# Patient Record
Sex: FEMALE | Race: WHITE
[De-identification: names, ages, dates, MRNs, and addresses within clinical notes are randomized per-mention and may not be internally consistent; named-entity substitution may affect disease eponyms.]

---

## 2017-03-01 NOTE — NM
EXAMINATION: Nuclear medicine hepatobiliary study (HIDA) with cholecystokinin (calculation of gallbl
adder ejection fraction for  function).

 

HISTORY: Right upper quadrant pain.

 

PROCEDURE:

Following intravenous administration of  3.5  mCi of technetium 99m Choletec, dynamic images were ob
tained up to one-hour post injection.

 

This is followed by slow intravenous administration of 1.6 mcg of CCK and additional dynamic images 
were obtained. Gallbladder ejection fraction is calculated.

 

FINDINGS:

The initial dynamic images demonstrates clearance of the tracer from the blood pool with the prompt 
tracer uptake by the liver. By 25 minutes tracer activity is noted in the gallbladder. The post CCK 
images demonstrates optimal contraction of the gallbladder with ejection fraction of 49 percent (nor
mal is equal or more than 35%). Tracer activity is noted in the small intestine following CCK admini
stration. 

 

IMPRESSION:

1. Patent cystic and common bile ducts. Normal liver function.

2. Normal gallbladder ejection fraction following CCK administration (  49 %; normal equal or more t
lacy 35%).

## 2019-11-15 ENCOUNTER — HOSPITAL ENCOUNTER (EMERGENCY)
Dept: HOSPITAL 56 - MW.ED | Age: 59
Discharge: HOME | End: 2019-11-15
Payer: COMMERCIAL

## 2019-11-15 DIAGNOSIS — M25.562: Primary | ICD-10-CM

## 2019-11-15 NOTE — EDM.PDOC
ED HPI GENERAL MEDICAL PROBLEM





- General


Chief Complaint: Lower Extremity Injury/Pain


Time Seen by Provider: 11/15/19 20:14


Source of Information: Reports: Patient


History Limitations: Reports: No Limitations





- History of Present Illness


INITIAL COMMENTS - FREE TEXT/NARRATIVE: 


HISTORY AND PHYSICAL:





History of present illness:


Patient is a 59-year-old female who presents to the emergency room with 

complaints of left posterior/medial knee pain. She states she has had some 

discomfort for approximately one week which is progressively gotten worse. She 

describes the pain as a sharp pressure when she fully extends her leg out or 

her leg is locked while ambulating. She denies any previous injury, trauma or 

falls. She does have an appointment next week with Dr. Benedict Nugent, but didn't 

want to wait that long to have it evaluated. Denies any numbness, tingling or 

weakness of the affected extremity.





Patient denies any fever, chills, headache, change in vision, syncope or near 

syncope. Denies any chest pain, back pain, shortness of breath or cough. Denies 

any GI or  symptoms. Patient has been eating and drinking appropriately.





Review of systems: 


As per history of present illness and below otherwise all systems reviewed and 

negative.





Past medical history: 


As per history of present illness and as reviewed below otherwise 

noncontributory.





Surgical history: 


As per history of present illness and as reviewed below otherwise 

noncontributory.





Social history: 


See social history for further information





Family history: 


As per history of present illness and as reviewed below otherwise 

noncontributory.





Physical exam:


General: Well developed and well nourished 59 year old female. Alert and 

orientated. Nontoxic appearing and in no acute distress


HEENT: Atraumatic, normocephalic, pupils equal and reactive bilaterally, 

negative for conjunctival pallor or scleral icterus, mucous membranes moist,

trachea midline. No drooling or trismus noted. No meningeal signs. No hot 

potato voice noted. 


Lungs: Clear to auscultation, breath sounds equal bilaterally, chest nontender.


Heart: S1S2, regular rate and rhythm without overt murmur


Abdomen: Soft, nondistended, nontender. 


Skin: Intact, warm, dry. No lesions or rashes noted.


Extremities: Moves all extremities per self without difficulty or deficits, 

negative drawer test, no knee instability, negative for cords or calf pain, and 

strong pedal/pretibial pulses bilaterally. Neurovascular unremarkable.


Neuro: Awake, alert, oriented. Cranial nerves II through XII unremarkable. 

Cerebellum unremarkable. Motor and sensory unremarkable throughout. Exam 

nonfocal.





Notes:


X-ray shows no acute findings. We discussed the need for follow up with ortho; 

could be a bakers cyst. Supportive care measures were reviewed and discussed. 

Voices understanding and is agreeable to plan of care. Denies any further 

questions or concerns at this time.





Diagnostics:


X-ray





Therapeutics:


Patellar Cut-Out, Crutches





Prescription:


Tramadol (#15)





Impression: 


Left knee pain





Plan:


1. Rest, ice, elevate the affected extremity. Please wear the splint and use 

crutches as directed.


2. Tylenol and/or Ibuprofen as needed for pain management. 


3. Follow up with the Orthopedic provider as we discussed. Return to the ED as 

needed and as discussed.





Definitive disposition and diagnosis as appropriate pending reevaluation and 

review of above.





  ** left knee


Pain Score (Numeric/FACES): 3





- Related Data


 Allergies











Allergy/AdvReac Type Severity Reaction Status Date / Time


 


No Known Allergies Allergy   Verified 11/15/19 20:32











Home Meds: 


 Home Meds





Diclofenac Sodium [Voltaren] 0 mg PO ASDIRECTED PRN 11/15/19 [History]


Fluticasone Propionate [Flonase Allergy Relief] 1 spray IN ASDIRECTED 11/15/19 [

History]


Pantoprazole Sodium [Protonix] 0 mg PO DAILY 11/15/19 [History]


traMADol [Ultram] 50 mg PO Q4H PRN #15 tab 11/15/19 [Rx]











Review of Systems





- Review of Systems


Review Of Systems: Comprehensive ROS is negative, except as noted in HPI.





ED EXAM, GENERAL





- Physical Exam


Exam: See Below (See dictation)





Course





- Vital Signs


Last Recorded V/S: 


 Last Vital Signs











Temp  96 F   11/15/19 20:20


 


Pulse  77   11/15/19 20:20


 


Resp  18   11/15/19 20:20


 


BP  176/89 H  11/15/19 20:20


 


Pulse Ox  95   11/15/19 20:20














- Orders/Labs/Meds


Orders: 


 Active Orders 24 hr











 Category Date Time Status


 


 Knee 3V Lt [CR] Stat Exams  11/15/19 20:28 Taken














Departure





- Departure


Time of Disposition: 20:56


Disposition: Home, Self-Care 01


Clinical Impression: 


Knee pain, left


Qualifiers:


 Chronicity: acute Qualified Code(s): M25.562 - Pain in left knee








- Discharge Information


Instructions:  Knee Pain, Adult, Easy-to-Read


Referrals: 


Benedict Nugent MD [Primary Care Provider] - 


Forms:  ED Department Discharge


Additional Instructions: 


The following information is given to patients seen in the emergency department 

who are being discharged to home. This information is to outline your options 

for follow-up care. We provide all patients seen in our emergency department 

with a follow-up referral.





The need for follow-up, as well as the timing and circumstances, are variable 

depending upon the specifics of your emergency department visit.





If you don't have a primary care physician on staff, we will provide you with a 

referral. We always advise you to contact your personal physician following an 

emergency department visit to inform them of the circumstance of the visit and 

for follow-up with them and/or the need for any referrals to a consulting 

specialist.





The emergency department will also refer you to a specialist when appropriate. 

This referral assures that you have the opportunity for follow-up care with a 

specialist. All of these measure are taken in an effort to provide you with 

optimal care, which includes your follow-up.





Under all circumstances we always encourage you to contact your private 

physician who remains a resource for coordinating your care. When calling for 

follow-up care, please make the office aware that this follow-up is from your 

recent emergency room visit. If for any reason you are refused follow-up, 

please contact the [facility] Emergency 

Department at [phone] and asked to speak to the emergency department 

charge nurse.





[facility]


Primary Care


[address] [place] [address]


Phone: [phone]


Fax: [phone]





[address] [address]


Phone: [phone]


Fax: [phone]





[facility]


Specialty Care - Orthopedic Clinic


[address]


[address] [facility], [address]


[place] [address]


Phone: [phone]





1. Rest, ice, elevate the affected extremity. Please wear the splint and use 

crutches as directed.


2. Tylenol and/or Ibuprofen as needed for pain management. 


3. Follow up with the Orthopedic provider as we discussed. Return to the ED as 

needed and as discussed.





- My Orders


Last 24 Hours: 


My Active Orders





11/15/19 20:28


Knee 3V Lt [CR] Stat 














- Assessment/Plan


Last 24 Hours: 


My Active Orders





11/15/19 20:28


Knee 3V Lt [CR] Stat

## 2019-11-15 NOTE — CR
INDICATION: heard a "pop" today, left knee pain x1 wk - no injury



TECHNIQUE:



Left knee 3 views.



COMPARISON:



None.



FINDINGS:



Bones: Alignment is normal. No fractures or bone lesions.  



Joint spaces: Unremarkable.  



Soft tissues: Unremarkable.  



IMPRESSION:



Unremarkable left knee.



Dictated by: John Shields MD @ 11/15/2019 20:52:04



(Electronically Signed)

## 2021-11-12 NOTE — EDM.PDOC
ED HPI GENERAL MEDICAL PROBLEM





- General


Chief Complaint: Respiratory Problem


Stated Complaint: POS FOR COVID/ DEHYDRATED


Time Seen by Provider: 21 07:17





- History of Present Illness


INITIAL COMMENTS - FREE TEXT/NARRATIVE: 


Otherwise well 61-year-old female presents with generalized weakness and nausea 

in the setting of COVID-19 infection.  Patient developed symptoms around 8 days 

ago and tested positive 3 days later.  Patient reports loss of taste and 

swelling significant postprandial nausea leading to diminished p.o. intake.  

Symptoms are associated with some generalized weakness.  There is a mild cough 

but no severe shortness of breath.  Patient reports the fatigue and generalized 

weakness and the most pronounced symptoms.  No abdominal pain couple episodes of

diarrhea and some vomiting with brushing of her teeth because her mouth is so 

dry but no trouble with persistent nausea.





- Related Data


                                    Allergies











Allergy/AdvReac Type Severity Reaction Status Date / Time


 


No Known Allergies Allergy   Verified 21 07:15











Home Meds: 


                                    Home Meds





Diclofenac Sodium [Voltaren] 0 mg PO ASDIRECTED PRN 11/15/19 [History]


Pantoprazole Sodium [Protonix] 0 mg PO DAILY 11/15/19 [History]


Ondansetron [Zofran ODT] 4 mg PO TID PRN 7 Days #21 tab.dis 21 [Rx]











Past Medical History


HEENT History: Reports: Sinusitis


Gastrointestinal History: Reports: GERD


OB/GYN History: Reports: Pregnancy


Musculoskeletal History: Reports: Arthritis





- Infectious Disease History


Infectious Disease History: Reports: Chicken Pox





- Past Surgical History


Female  Surgical History: Reports:  Section





Social & Family History





- Family History


Family Medical History: No Pertinent Family History





- Tobacco Use


Tobacco Use Status *Q: Never Tobacco User


Second Hand Smoke Exposure: No





- Caffeine Use


Caffeine Use: Reports: Soda





- Recreational Drug Use


Recreational Drug Use: No





ED ROS GENERAL





- Review of Systems


Review Of Systems: See Below


Free Text/Narrative/Comment: 


General: Per HPI


Skin: No rash.


Eyes: No vision problems.


ENT: No sore throat.


Neck: No neck stiffness.


Respiratory: Per HPI


Cardiac: No chest pain.


Gastrointestinal: Per HPI


Urinary: No dysuria.


Musculoskeletal: No myalgias/arthralgias.


Neurologic: No headache.





ED EXAM, GENERAL





- Physical Exam


Exam: See Below


Free Text/Narrative:: 





General Appearance: No acute distress, appears comfortable


Skin: No rash


HEENT: Normocephalic/atraumatic, sclera anicteric, mucous membranes dry


Neck: Normal range of motion


Chest and Lungs: Bilateral breath sounds, clear to auscultation


Cardiovascular: Minimally tachycardic rate regular rhythm intact distal 

perfusion


Abdomen: Soft, non-tender


Musculoskeletal: No edema or tenderness


Neurologic: Awake, alert, no obvious deficits, moving all extremities


Psychiatric: Appropriate, cooperative





Course





- Vital Signs


Last Recorded V/S: 


                                Last Vital Signs











Temp  99.1 F   21 07:16


 


Pulse  101 H  21 07:16


 


Resp  18   21 07:16


 


BP  147/71 H  21 07:16


 


Pulse Ox  96   21 07:16














- Orders/Labs/Meds


Orders: 


                               Active Orders 24 hr











 Category Date Time Status


 


 Sodium Chloride 0.9% [Normal Saline] 1,000 ml Med  21 07:27 Active





 IV ONETIME   


 


 Sodium Chloride 0.9% [Saline Flush] Med  21 07:26 Active





 10 ml FLUSH ASDIRECTED PRN   


 


 Sodium Chloride 0.9% [Saline Flush] Med  21 07:26 Active





 2.5 ml FLUSH ASDIRECTED PRN   


 


 Saline Lock Insert [OM.PC] Stat Oth  21 07:26 Ordered








                                Medication Orders





Sodium Chloride (Normal Saline)  1,000 mls @ 999 mls/hr IV ONETIME ONE


   Stop: 21 08:27


   Last Admin: 21 07:36  Dose: 999 mls/hr


   Documented by: HAMILTON


Sodium Chloride (Sodium Chloride 0.9% 10 Ml Syringe)  10 ml FLUSH ASDIRECTED PRN


   PRN Reason: Keep Vein Open


   Last Admin: 21 07:36  Dose: 10 ml


   Documented by: HAMILTON


Sodium Chloride (Sodium Chloride 0.9% 2.5 Ml Syringe)  2.5 ml FLUSH ASDIRECTED 

PRN


   PRN Reason: Keep Vein Open


   Last Admin: 21 07:36  Dose: 2.5 ml


   Documented by: HAMILTON








Labs: 


                                Laboratory Tests











  21 Range/Units





  07:30 07:30 


 


WBC  3.00 L   (4.0-11.0)  K/uL


 


RBC  4.58   (4.30-5.90)  M/uL


 


Hgb  12.1   (12.0-16.0)  g/dL


 


Hct  36.5   (36.0-46.0)  %


 


MCV  79.7 L   (80.0-98.0)  fL


 


MCH  26.4 L   (27.0-32.0)  pg


 


MCHC  33.2   (31.0-37.0)  g/dL


 


RDW Std Deviation  44.9   (28.0-62.0)  fl


 


RDW Coeff of Aaron  15   (11.0-15.0)  %


 


Plt Count  120 L   (150-400)  K/uL


 


MPV  9.70   (7.40-12.00)  fL


 


Neut % (Auto)  78.4   (48.0-80.0)  %


 


Lymph % (Auto)  15.3 L   (16.0-40.0)  %


 


Mono % (Auto)  6.0   (0.0-15.0)  %


 


Eos % (Auto)  0.3   (0.0-7.0)  %


 


Baso % (Auto)  0.0   (0.0-1.5)  %


 


Neut # (Auto)  2.4   (1.4-5.7)  K/uL


 


Lymph # (Auto)  0.5 L   (0.6-2.4)  K/uL


 


Mono # (Auto)  0.2   (0.0-0.8)  K/uL


 


Eos # (Auto)  0.0   (0.0-0.7)  K/uL


 


Baso # (Auto)  0.0   (0.0-0.1)  K/uL


 


Nucleated RBC %  0.0   /100WBC


 


Nucleated RBCs #  0   K/uL


 


Sodium   134 L  (136-145)  mmol/L


 


Potassium   4.0  (3.5-5.1)  mmol/L


 


Chloride   98  ()  mmol/L


 


Carbon Dioxide   25.0  (21.0-32.0)  mmol/L


 


BUN   12  (7.0-18.0)  mg/dL


 


Creatinine   1.3 H  (0.6-1.0)  mg/dL


 


Est Cr Clr Drug Dosing   45.31  mL/min


 


Estimated GFR (MDRD)   41.9  ml/min


 


Glucose   113 H  ()  mg/dL


 


Calcium   8.3 L  (8.5-10.1)  mg/dL


 


Magnesium   1.8  (1.8-2.4)  mg/dL


 


Total Bilirubin   0.4  (0.2-1.0)  mg/dL


 


AST   51 H  (15-37)  IU/L


 


ALT   32  (14-63)  IU/L


 


Alkaline Phosphatase   106  ()  U/L


 


Total Protein   8.1  (6.4-8.2)  g/dL


 


Albumin   3.5  (3.4-5.0)  g/dL


 


Globulin   4.6 H  (2.6-4.0)  g/dL


 


Albumin/Globulin Ratio   0.8 L  (0.9-1.6)  


 


Lipase   286  ()  U/L











Meds: 


Medications











Generic Name Dose Route Start Last Admin





  Trade Name Freq  PRN Reason Stop Dose Admin


 


Sodium Chloride  1,000 mls @ 999 mls/hr  21 07:27  21 07:36





  Normal Saline  IV  21 08:27  999 mls/hr





  ONETIME ONE   Administration


 


Sodium Chloride  10 ml  21 07:26  21 07:36





  Sodium Chloride 0.9% 10 Ml Syringe  FLUSH   10 ml





  ASDIRECTED PRN   Administration





  Keep Vein Open  


 


Sodium Chloride  2.5 ml  21 07:26  21 07:36





  Sodium Chloride 0.9% 2.5 Ml Syringe  FLUSH   2.5 ml





  ASDIRECTED PRN   Administration





  Keep Vein Open  














Departure





- Departure


Time of Disposition: 08:15


Disposition: Home, Self-Care 01


Condition: Good


Clinical Impression: 


 Dehydration, COVID-19








- Discharge Information


*PRESCRIPTION DRUG MONITORING PROGRAM REVIEWED*: Not Applicable


*COPY OF PRESCRIPTION DRUG MONITORING REPORT IN PATIENT JESICA: Not Applicable


Prescriptions: 


Ondansetron [Zofran ODT] 4 mg PO TID PRN 7 Days #21 tab.dis


 PRN Reason: nausea / vomiting


Instructions:  Dehydration, Adult, Easy-to-Read, 10 Things You Can Do to Manage 

Your COVID-19 Symptoms at Home - [facility] (2021)


Referrals: 


Benedict Nugent MD [Primary Care Provider] - 


Forms:  ED Department Discharge


Additional Instructions: 


Your labs today did show mild dehydration.  Your kidney function is very mildly 

abnormal with a creatinine of 1.3 (normal is 1.0 or less).  This represents the 

effects of dehydration.  You were given IV fluid for this.  With time your 

kidney function is expected to return to normal.  It is important that your 

doctor recheck your kidney function in a few weeks to make sure that it has 

improved.  A prescription for an antinausea medicine has been sent to the 

pharmacy.  While this will not help your loss of taste or smell that should help

with the nausea that you been experiencing after eating.  I encourage you to 

wear in contact with your doctor if your symptoms worsen or you have any other 

new symptoms that concern you please call your doctor or return to the ER.  If 

you do not have a primary care doctor you can follow-up at one of the primary 

care clinics listed below.





[facility] - Primary Care


[address] [place] [address]


Phone: [phone]


Fax: [phone]


 





[facility]


[address] [place] [address]


Phone: [phone]


Fax: [phone]








The following information is given to patients seen in the emergency department 

who are being discharged to home. This information is to outline your options 

for follow-up care. We provide all patients seen in our emergency department 

with a follow-up referral.





The need for follow-up, as well as the timing and circumstances, are variable 

depending upon the specifics of your emergency department visit.





If you don't have a primary care physician on staff, we will provide you with a 

referral. We always advise you to contact your personal physician following an 

emergency department visit to inform them of the circumstance of the visit and 

for follow-up with them and/or the need for any referrals to a consulting 

specialist.





The emergency department will also refer you to a specialist when appropriate. 

This referral assures that you have the opportunity for follow-up care with a 

specialist. All of these measure are taken in an effort to provide you with 

optimal care, which includes your follow-up.





Under all circumstances we always encourage you to contact your private 

physician who remains a resource for coordinating your care. When calling for 

follow-up care, please make the office aware that this follow-up is from your 

recent emergency room visit. If for any reason you are refused follow-up, please

contact the [facility] Emergency Department

at [phone] and asked to speak to the emergency department charge nurse.





Sepsis Event Note (ED)





- Evaluation


Sepsis Screening Result: No Definite Risk





- Focused Exam


Vital Signs: 


                                   Vital Signs











  Temp Pulse Resp BP Pulse Ox


 


 21 07:16  99.1 F  101 H  18  147/71 H  96














- My Orders


Last 24 Hours: 


My Active Orders





21 07:26


Sodium Chloride 0.9% [Saline Flush]   10 ml FLUSH ASDIRECTED PRN 


Sodium Chloride 0.9% [Saline Flush]   2.5 ml FLUSH ASDIRECTED PRN 


Saline Lock Insert [OM.PC] Stat 





21 07:27


Sodium Chloride 0.9% [Normal Saline] 1,000 ml IV ONETIME 














- Assessment/Plan


Last 24 Hours: 


My Active Orders





21 07:26


Sodium Chloride 0.9% [Saline Flush]   10 ml FLUSH ASDIRECTED PRN 


Sodium Chloride 0.9% [Saline Flush]   2.5 ml FLUSH ASDIRECTED PRN 


Saline Lock Insert [OM.PC] Stat 





21 07:27


Sodium Chloride 0.9% [Normal Saline] 1,000 ml IV ONETIME 











Assessment:: 


61-year-old female presenting with signs symptoms consistent with dehydration 

and COVID-19 infection.  CBC, CMP, lipase, magnesium to assess hydration status 

and renal function.  To exclude pancreatitis as well.  Exam is benign there is a

very minimal occasional tachycardia on the monitor but otherwise her vital signs

are normal.  No indication for hospitalization single liter of IV fluid for now 

and will reassess.





0815: Labs notable for mild leukocytosis which is expected.  Chemistry 

relatively unremarkable borderline low hyponatremia is felt to be hypovolemic 

related to dehydration.  Likewise mild MAYELA.  Vital signs remain normal work of 

breathing remains normal patient felt safe for discharge with follow-up.